# Patient Record
Sex: FEMALE
[De-identification: names, ages, dates, MRNs, and addresses within clinical notes are randomized per-mention and may not be internally consistent; named-entity substitution may affect disease eponyms.]

---

## 2019-10-19 ENCOUNTER — HOSPITAL ENCOUNTER (EMERGENCY)
Dept: HOSPITAL 25 - UCEAST | Age: 24
Discharge: HOME | End: 2019-10-19
Payer: COMMERCIAL

## 2019-10-19 VITALS — DIASTOLIC BLOOD PRESSURE: 76 MMHG | SYSTOLIC BLOOD PRESSURE: 114 MMHG

## 2019-10-19 DIAGNOSIS — J02.9: Primary | ICD-10-CM

## 2019-10-19 PROCEDURE — 87651 STREP A DNA AMP PROBE: CPT

## 2019-10-19 PROCEDURE — G0463 HOSPITAL OUTPT CLINIC VISIT: HCPCS

## 2019-10-19 PROCEDURE — 99201: CPT

## 2019-10-19 NOTE — UC
Throat Pain/Nasal Jayson HPI





- HPI Summary


HPI Summary: 





gradually worsening ST over 4 days, for 2 days has had fever. is taking DayQuil/

NyQUil for symptom relief








- History of Current Complaint


Chief Complaint: UCGeneralIllness


Stated Complaint: SORE THROAT


Time Seen by Provider: 10/19/19 17:54


Hx Obtained From: Patient


Hx Last Menstrual Period: 9/15/19


Onset/Duration: Gradual Onset


Severity: Mild


Pain Intensity: 2


Cough: None


Associated Signs & Symptoms: Positive: Hoarseness, Fever





- Allergies/Home Medications


Allergies/Adverse Reactions: 


 Allergies











Allergy/AdvReac Type Severity Reaction Status Date / Time


 


No Known Allergies Allergy   Verified 10/19/19 17:32











Home Medications: 


 Home Medications





NK [No Home Medications Reported]  10/19/19 [History Confirmed 10/19/19]











PMH/Surg Hx/FS Hx/Imm Hx


Previously Healthy: Yes





- Surgical History


Surgical History: None





- Family History


Known Family History: Positive: Non-Contributory





- Social History


Occupation: Unemployed


Lives: With Family


Alcohol Use: None


Substance Use Type: None


Smoking Status (MU): Never Smoked Tobacco





Review of Systems


All Other Systems Reviewed And Are Negative: Yes


Constitutional: Positive: Fever, Fatigue


Skin: Positive: Negative.  Negative: Rash


Eyes: Positive: Negative


ENT: Positive: Sore Throat.  Negative: Ear Ache, Sinus Congestion


Respiratory: Positive: Negative


Cardiovascular: Positive: Negative


Gastrointestinal: Positive: Negative


Neurological: Positive: Negative.  Negative: Headache


Psychological: Positive: Negative


Is Patient Immunocompromised?: No





Physical Exam


Triage Information Reviewed: Yes


Appearance: Well-Appearing, No Pain Distress, Well-Nourished


Vital Signs: 


 Initial Vital Signs











Temp  99.3 F   10/19/19 17:25


 


Pulse  104   10/19/19 17:25


 


Resp  16   10/19/19 17:25


 


BP  114/76   10/19/19 17:25


 


Pulse Ox  97   10/19/19 17:25











Vital Signs Reviewed: Yes


Eyes: Positive: Conjunctiva Clear


ENT: Positive: Pharyngeal erythema, TMs normal, Hoarse voice, Other - voice is 

hoarse but no diff breathing or swallowing on exam.  Negative: Nasal congestion

, Tonsillar swelling, Tonsillar exudate, Sinus tenderness


Neck exam: Normal


Neck: Positive: Supple, Nontender, No Lymphadenopathy


Respiratory Exam: Normal


Respiratory: Positive: Lungs clear


Cardiovascular Exam: Normal


Cardiovascular: Positive: RRR


Neurological: Positive: Alert


Psychological Exam: Normal


Skin Exam: Normal


Skin: Negative: Rashes





Throat Pain/Nasal Course/Dx





- Differential Dx/Diagnosis


Differential Diagnosis/HQI/PQRI: Epiglottitis, Laryngitis, Pharyngitis, 

Sinusitis, Tonsillitis, URI


Provider Diagnosis: 


 Pharyngitis








Discharge ED





- Sign-Out/Discharge


Documenting (check all that apply): Patient Departure


All imaging exams completed and their final reports reviewed: No Studies





- Discharge Plan


Condition: Good


Disposition: HOME


Patient Education Materials:  Pharyngitis (ED)


Referrals: 


No Primary Care Phys,NOPCP [Primary Care Provider] - 


Additional Instructions: 


drink plenty of fluids and rest





use ibuprofen 600mg every 6 hours for sore throat and fever





May also add Tylenol as directed if needed





Return here if no better 48 hours or if symptoms worsen at anytime








- Billing Disposition and Condition


Condition: GOOD


Disposition: Home

## 2020-01-26 ENCOUNTER — HOSPITAL ENCOUNTER (EMERGENCY)
Dept: HOSPITAL 25 - UCEAST | Age: 25
Discharge: HOME | End: 2020-01-26
Payer: COMMERCIAL

## 2020-01-26 VITALS — SYSTOLIC BLOOD PRESSURE: 114 MMHG | DIASTOLIC BLOOD PRESSURE: 75 MMHG

## 2020-01-26 DIAGNOSIS — J03.90: Primary | ICD-10-CM

## 2020-01-26 PROCEDURE — 87651 STREP A DNA AMP PROBE: CPT

## 2020-01-26 PROCEDURE — 99212 OFFICE O/P EST SF 10 MIN: CPT

## 2020-01-26 PROCEDURE — G0463 HOSPITAL OUTPT CLINIC VISIT: HCPCS

## 2020-01-26 NOTE — UC
Throat Pain/Nasal Jayson HPI





- HPI Summary


HPI Summary: 





6 DAYS OF SORE THROAT AND PAIN WITH SWALLOWING BUT NO DIFFICULTY SWALLOWING.  

PATIENT DENIES FEVER, COUGH, CONGESTION, NAUSEA.  NO HEADACHE.  FEELS OTHERWISE 

WELL.





- History of Current Complaint


Chief Complaint: UCGeneralIllness


Stated Complaint: SORE THROAT


Time Seen by Provider: 01/26/20 19:24


Hx Obtained From: Patient


Hx Last Menstrual Period: 1/25/20


Onset/Duration: Gradual Onset, Lasting Days, Still Present


Severity: Moderate


Pain Intensity: 3


Pain Scale Used: 0-10 Numeric


Cough: None


Associated Signs & Symptoms: Positive: Negative





- Allergies/Home Medications


Allergies/Adverse Reactions: 


 Allergies











Allergy/AdvReac Type Severity Reaction Status Date / Time


 


No Known Allergies Allergy   Verified 10/19/19 17:32














PMH/Surg Hx/FS Hx/Imm Hx


Previously Healthy: Yes





- Surgical History


Surgical History: None





- Family History


Known Family History: Positive: Non-Contributory





- Social History


Alcohol Use: Occasionally


Substance Use Type: None


Smoking Status (MU): Never Smoked Tobacco





Review of Systems


All Other Systems Reviewed And Are Negative: Yes


Constitutional: Positive: Negative


ENT: Positive: Sore Throat


Respiratory: Positive: Negative


Cardiovascular: Positive: Negative


Gastrointestinal: Positive: Negative


Musculoskeletal: Positive: Negative


Neurological: Positive: Negative





Physical Exam


Triage Information Reviewed: Yes


Appearance: Well-Appearing, No Pain Distress, Well-Nourished


Vital Signs: 


 Initial Vital Signs











Temp  98.6 F   01/26/20 18:55


 


Pulse  90   01/26/20 18:55


 


Resp  16   01/26/20 18:55


 


BP  114/75   01/26/20 18:55


 


Pulse Ox  98   01/26/20 18:55








 Laboratory Tests











  01/26/20





  19:31


 


Group A Strep Rapid  Negative











Vital Signs Reviewed: Yes


Eyes: Positive: Conjunctiva Clear


ENT: Positive: Hearing grossly normal, TMs normal, Tonsillar swelling, 

Tonsillar exudate


Neck: Positive: Supple, Nontender, No Lymphadenopathy


Respiratory Exam: Normal


Cardiovascular Exam: Normal


Abdomen Description: Positive: Soft


Musculoskeletal: Positive: No Edema


Neurological: Positive: Alert


Psychological: Positive: Age Appropriate Behavior


Skin: Negative: Rashes





Throat Pain/Nasal Course/Dx





- Course


Course Of Treatment: 





STREP TEST NEGATIVE.  PATIENT WITH ENLARGED TONSILS WITH EXUDATE.  SHE 

OTHERWISE FEELS WELL.  HAVE RECOMMENDED A SHORT BURST OF PREDNISONE TO SEE IF 

THIS HELPS.  OTC IBUPROFEN TO HELP WITH SWELLING AND DISCOMFORT.  GIVEN THAT 

SHE HAS HAD THE SYMPTOMS FOR OVER 6 DAYS, IF SHE DOES NOT IMPROVE WITH 

IBUPROFEN AND PREDNISONE - PRESCRIPTION FOR AMOXICILLIN HAS BEEN SENT TO 

PHARMACY.  FOLLOW-UP IF NOT IMPROVING WITH TREATMENT.





- Differential Dx/Diagnosis


Provider Diagnosis: 


 Tonsillitis








Discharge ED





- Sign-Out/Discharge


Documenting (check all that apply): Patient Departure


All imaging exams completed and their final reports reviewed: No Studies





- Discharge Plan


Condition: Stable


Disposition: HOME


Prescriptions: 


Amoxicillin PO (*) [Amoxicillin 500 MG CAP*] 500 mg PO Q12H #20 cap


predniSONE 20 mg TAB [Deltasone 20 MG TAB*] 40 mg PO DAILY #8 tab


Patient Education Materials:  Tonsillitis (ED)


Referrals: 


[facility] [Outside] - If Needed


Additional Instructions: 


YOUR SYMPTOMS MAY BE VIRALLY MEDIATED BUT GIVEN THE LENGTH OF TIME YOU HAVE 

BEEN ILL AND THE APPEARANCE OF YOUR TONSILS WE WILL COVER YOU WITH ANTIBIOTICS. 

IF YOU START THE MEDICINE BE SURE TO TAKE IT FOR THE FULL COURSE. REST, HYDRATE

, OTC MEDS AS NEEDED. WILL ALSO TREAT WITH PREDNISONE TO HELP WITH 

INFLAMMATION. SEEK FOLLOW-UP WITH YOUR PCP IF YOU ARE NOT IMPROVING OVER THE 

NEXT 1-2 WEEKS.





GO TO THE ER WITHOUT FAIL IF YOU ARE NOT ABLE TO SWALLOW OR IF YOU DEVELOP 

INCREASING PAIN, SWELLING, FEVER OR ANY OTHER CONCERNING SYMPTOMS.





CALL THE NUMBER BELOW FOR ASSISTANCE IN ESTABLISHING WITH A PCP


An additional resource available to assist in finding the appropriate physician 

for your health care needs is [facility] ([facility]).  

You may contact them by calling [phone].





- Billing Disposition and Condition


Condition: STABLE


Disposition: Home

## 2020-03-08 ENCOUNTER — HOSPITAL ENCOUNTER (EMERGENCY)
Dept: HOSPITAL 25 - UCEAST | Age: 25
Discharge: HOME | End: 2020-03-08
Payer: COMMERCIAL

## 2020-03-08 VITALS — SYSTOLIC BLOOD PRESSURE: 137 MMHG | DIASTOLIC BLOOD PRESSURE: 90 MMHG

## 2020-03-08 DIAGNOSIS — J02.9: ICD-10-CM

## 2020-03-08 DIAGNOSIS — B34.9: Primary | ICD-10-CM

## 2020-03-08 PROCEDURE — 85025 COMPLETE CBC W/AUTO DIFF WBC: CPT

## 2020-03-08 PROCEDURE — 99212 OFFICE O/P EST SF 10 MIN: CPT

## 2020-03-08 PROCEDURE — 86308 HETEROPHILE ANTIBODY SCREEN: CPT

## 2020-03-08 PROCEDURE — 36415 COLL VENOUS BLD VENIPUNCTURE: CPT

## 2020-03-08 PROCEDURE — 80053 COMPREHEN METABOLIC PANEL: CPT

## 2020-03-08 PROCEDURE — 87651 STREP A DNA AMP PROBE: CPT

## 2020-03-08 PROCEDURE — 86664 EPSTEIN-BARR NUCLEAR ANTIGEN: CPT

## 2020-03-08 PROCEDURE — 87070 CULTURE OTHR SPECIMN AEROBIC: CPT

## 2020-03-08 PROCEDURE — 86665 EPSTEIN-BARR CAPSID VCA: CPT

## 2020-03-08 PROCEDURE — G0463 HOSPITAL OUTPT CLINIC VISIT: HCPCS

## 2020-03-08 NOTE — UC
Throat Pain/Nasal Jayson HPI





- HPI Summary


HPI Summary: 


has had exudative pharyngitis for about 3 weeks   some relief with prednisone---

strep negative then and is negative now








- History of Current Complaint


Chief Complaint: UCGeneralIllness


Stated Complaint: TROUBLE SWALLOWING


Time Seen by Provider: 03/08/20 17:55


Hx Obtained From: Patient


Hx Last Menstrual Period: 4 weeks


Pregnant?: No


Onset/Duration: Gradual Onset, Lasting Days - 3, Still Present


Pain Intensity: 6


Pain Scale Used: 0-10 Numeric


Cough: None





- Allergies/Home Medications


Allergies/Adverse Reactions: 


 Allergies











Allergy/AdvReac Type Severity Reaction Status Date / Time


 


No Known Allergies Allergy   Verified 03/08/20 17:52











Home Medications: 


 Home Medications





Etonogestrel [Nexplanon] 68 mg IMPLANT DAILY 10/19/19 [History Confirmed 03/08/ 20]


Amoxicillin PO (*) [Amoxicillin 500 MG CAP*] 500 mg PO Q12H #20 cap 01/26/20 [

Rx Confirmed 03/08/20]


predniSONE 50 mg TAB [Deltasone 50 mg TAB] 50 mg PO DAILY #4 tab 03/08/20 [Rx]











PMH/Surg Hx/FS Hx/Imm Hx


Previously Healthy: Yes





- Surgical History


Surgical History: None





- Family History


Known Family History: Positive: Non-Contributory





- Social History


Occupation: Unemployed


Lives: Dormitory/Roommates


Alcohol Use: Occasionally


Substance Use Type: None


Smoking Status (MU): Never Smoked Tobacco





Review of Systems


All Other Systems Reviewed And Are Negative: Yes


Constitutional: Positive: Fever, Fatigue


Skin: Positive: Negative


Eyes: Positive: Negative


ENT: Positive: Sore Throat


Respiratory: Positive: Negative


Cardiovascular: Positive: Negative


Gastrointestinal: Positive: Negative


Genitourinary: Positive: Negative


Motor: Positive: Negative


Neurovascular: Positive: Negative


Musculoskeletal: Positive: Negative


Neurological/Mental Status: Positive: Negative


Psychological: Positive: Negative


Is Patient Immunocompromised?: No





Physical Exam


Triage Information Reviewed: Yes


Appearance: No Pain Distress, Well-Nourished, Ill-Appearing - mild


Vital Signs: 


 Initial Vital Signs











Temp  100 F   03/08/20 17:43


 


Pulse  113   03/08/20 17:43


 


Resp  16   03/08/20 17:43


 


BP  137/90   03/08/20 17:43


 


Pulse Ox  97   03/08/20 17:43











Vital Signs Reviewed: Yes


Eye Exam: Normal


Eyes: Positive: Conjunctiva Clear


ENT Exam: Normal


ENT: Positive: Normal ENT inspection, Hearing grossly normal, Pharyngeal 

erythema, TMs normal, Tonsillar swelling, Tonsillar exudate, Uvula midline.  

Negative: Nasal congestion, Trismus, Muffled voice, Hoarse voice, Dental 

tenderness, Sinus tenderness


Dental Exam: Normal


Neck exam: Normal


Neck: Positive: Supple, Nontender, Enlarged Nodes @ - bilateral anterior 

cervical


Respiratory Exam: Normal


Respiratory: Positive: Chest non-tender, Lungs clear, Normal breath sounds, No 

respiratory distress, No accessory muscle use


Cardiovascular Exam: Normal


Cardiovascular: Positive: RRR, No Murmur, Pulses Normal, Brisk Capillary Refill


Musculoskeletal Exam: Normal


Musculoskeletal: Positive: Strength Intact, ROM Intact, No Edema


Neurological Exam: Normal


Neurological: Positive: Alert, Muscle Tone Normal


Psychological Exam: Normal


Skin Exam: Normal





Diagnostics





- Laboratory


Lab Results: 





RST -





Throat Pain/Nasal Course/Dx





- Course


Course Of Treatment: 





Burst dose of prednisone, throat culture, lab studies, finish amoxicillin 

pending cultures-follow at [facility] this week





- Differential Dx/Diagnosis


Provider Diagnosis: 


 Acute viral syndrome, Exudative pharyngitis








Discharge ED





- Sign-Out/Discharge


Documenting (check all that apply): Patient Departure


All imaging exams completed and their final reports reviewed: No Studies





- Discharge Plan


Condition: Stable


Disposition: HOME


Prescriptions: 


predniSONE 50 mg TAB [Deltasone 50 mg TAB] 50 mg PO DAILY #4 tab


Patient Education Materials:  Mononucleosis (ED), Tonsillitis (ED), Viral 

Syndrome (ED)


Referrals: 


[facility] [Outside] - 2 Days





- Billing Disposition and Condition


Condition: STABLE


Disposition: Home

## 2020-03-09 LAB
ALBUMIN SERPL BCG-MCNC: 4.6 G/DL (ref 3.2–5.2)
ALBUMIN/GLOB SERPL: 1.9 {RATIO} (ref 1–3)
ALP SERPL-CCNC: 53 U/L (ref 34–104)
ALT SERPL W P-5'-P-CCNC: 11 U/L (ref 7–52)
ANION GAP SERPL CALC-SCNC: 6 MMOL/L (ref 2–11)
AST SERPL-CCNC: 15 U/L (ref 13–39)
BASOPHILS # BLD AUTO: 0.1 10^3/UL (ref 0–0.2)
BUN SERPL-MCNC: 6 MG/DL (ref 6–24)
BUN/CREAT SERPL: 9.4 (ref 8–20)
CALCIUM SERPL-MCNC: 9.6 MG/DL (ref 8.6–10.3)
CHLORIDE SERPL-SCNC: 104 MMOL/L (ref 101–111)
EOSINOPHIL # BLD AUTO: 0.2 10^3/UL (ref 0–0.6)
GLOBULIN SER CALC-MCNC: 2.4 G/DL (ref 2–4)
GLUCOSE SERPL-MCNC: 85 MG/DL (ref 70–100)
HCO3 SERPL-SCNC: 29 MMOL/L (ref 22–32)
HCT VFR BLD AUTO: 45 % (ref 35–47)
HGB BLD-MCNC: 15.1 G/DL (ref 12–16)
LYMPHOCYTES # BLD AUTO: 1.4 10^3/UL (ref 1–4.8)
MCH RBC QN AUTO: 33 PG (ref 27–31)
MCHC RBC AUTO-ENTMCNC: 34 G/DL (ref 31–36)
MCV RBC AUTO: 96 FL (ref 80–97)
MONOCYTES # BLD AUTO: 0.6 10^3/UL (ref 0–0.8)
NEUTROPHILS # BLD AUTO: 5.7 10^3/UL (ref 1.5–7.7)
NRBC # BLD AUTO: 0 10^3/UL
NRBC BLD QL AUTO: 0.2
PLATELET # BLD AUTO: 243 10^3/UL (ref 150–450)
POTASSIUM SERPL-SCNC: 3.7 MMOL/L (ref 3.5–5)
PROT SERPL-MCNC: 7 G/DL (ref 6.4–8.9)
RBC # BLD AUTO: 4.62 10^6 /UL (ref 3.7–4.87)
SODIUM SERPL-SCNC: 139 MMOL/L (ref 135–145)
WBC # BLD AUTO: 8 10^3/UL (ref 3.5–10.8)

## 2020-03-10 NOTE — UC
- Progress Note


Progress Note: 


Please call patient and inform that EBV testing was positive, which suggests 

past infection. Continue with symptomatic treatment.








Course/Dx





- Diagnoses


Provider Diagnoses: 


 Acute viral syndrome, Exudative pharyngitis








Discharge ED





- Sign-Out/Discharge


Documenting (check all that apply): Post-Discharge Follow Up


All imaging exams completed and their final reports reviewed: No Studies





- Discharge Plan


Condition: Stable


Disposition: HOME


Prescriptions: 


predniSONE 50 mg TAB [Deltasone 50 mg TAB] 50 mg PO DAILY #4 tab


Patient Education Materials:  Mononucleosis (ED), Tonsillitis (ED), Viral 

Syndrome (ED)


Referrals: 


[facility] [Outside] - 2 Days





- Billing Disposition and Condition


Condition: STABLE


Disposition: Home